# Patient Record
Sex: FEMALE | Race: WHITE | HISPANIC OR LATINO | URBAN - METROPOLITAN AREA
[De-identification: names, ages, dates, MRNs, and addresses within clinical notes are randomized per-mention and may not be internally consistent; named-entity substitution may affect disease eponyms.]

---

## 2019-02-04 ENCOUNTER — APPOINTMENT (OUTPATIENT)
Dept: URBAN - METROPOLITAN AREA CLINIC 219 | Age: 34
Setting detail: DERMATOLOGY
End: 2019-02-04

## 2019-02-04 DIAGNOSIS — L70.0 ACNE VULGARIS: ICD-10-CM

## 2019-02-04 PROBLEM — L29.8 OTHER PRURITUS: Status: ACTIVE | Noted: 2019-02-04

## 2019-02-04 PROCEDURE — OTHER PRESCRIPTION: OTHER

## 2019-02-04 PROCEDURE — 99202 OFFICE O/P NEW SF 15 MIN: CPT

## 2019-02-04 PROCEDURE — OTHER TREATMENT REGIMEN: OTHER

## 2019-02-04 PROCEDURE — OTHER MIPS QUALITY: OTHER

## 2019-02-04 RX ORDER — CLINDAMYCIN PHOS/BENZOYL PEROX 1.2(1)%-5%
APPLY GEL (GRAM) TOPICAL EVERY MORNING
Qty: 1 | Refills: 3 | Status: ERX | COMMUNITY
Start: 2019-02-04

## 2019-02-04 RX ORDER — DOXYCYCLINE 100 MG/1
1 CAPSULE ORAL TWICE A DAY
Qty: 60 | Refills: 3 | Status: ERX | COMMUNITY
Start: 2019-02-04

## 2019-02-04 RX ORDER — TRETINOIN 0.25 MG/G
APPLY CREAM TOPICAL
Qty: 1 | Refills: 3 | Status: ERX | COMMUNITY
Start: 2019-02-04

## 2019-02-04 NOTE — PROCEDURE: TREATMENT REGIMEN
Detail Level: Simple
Plan: Mild Cleansers (cetaphil or cerave soap free cleanser)\\nDaily moisturizer with sunscreen (recommended a moisturizer with zinc or titanium, Olay complete in the morning and vanicream lite lotion at night) \\nMay use warm compresses and a needle sanitized with alcohol as needed for pustules \\nDoxycycline Monohydrate 100 mg twice a day with food and water (counseled RE: sun sensitivity and need for pregnancy prevention)\\nDuac every morning, may spot treat twice a day as needed \\nTretinoin 0.25% cream each night as tolerated (counseled patient to start every other night, slowly working up to every night as tolerated)\\nWill consider checking free testosterone and DHEAS if acne is resistant to treatment

## 2019-02-04 NOTE — HPI: PIMPLES (ACNE)
Is This A New Presentation, Or A Follow-Up?: Acne
Additional Comments (Use Complete Sentences): Patient has been using fresh soy face cleanser and Lancôme nightly wash, a moisturizer with SPF 15.  Patient states she has very sensitive skin.